# Patient Record
(demographics unavailable — no encounter records)

---

## 2025-03-13 NOTE — ASSESSMENT
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 Most recent labs with SCr ~ 1.2; GFR 61 ml/min UA  previously anita CASTILLO unable to calculate most recent UACR 818 mg/g- will repeat Renal US- b/l renal cysts CKD likely in setting of obesity, HTN Maintain on ARB LDL above goal for CKD; weight loss encouraged Continue Statin  HTN Bp stable Continue amlodipine, Olmesartan, HCTZ  Vitamin D insuff Vitamin D levels 21 Start vitamin D supplements  Chronic thrombocytopenia- mild  monitor for now  DM- HbA1c 6.7 continue MFN, ARB will consider SGLT2 inh for albuminuria  RTC in 6 months.

## 2025-03-13 NOTE — PHYSICAL EXAM
[General Appearance - Alert] : alert [General Appearance - In No Acute Distress] : in no acute distress [Sclera] : the sclera and conjunctiva were normal [Outer Ear] : the ears and nose were normal in appearance [Neck Appearance] : the appearance of the neck was normal [Neck Cervical Mass (___cm)] : no neck mass was observed [Jugular Venous Distention Increased] : there was no jugular-venous distention [Thyroid Diffuse Enlargement] : the thyroid was not enlarged [Thyroid Nodule] : there were no palpable thyroid nodules [Auscultation Breath Sounds / Voice Sounds] : lungs were clear to auscultation bilaterally [Heart Rate And Rhythm] : heart rate was normal and rhythm regular [Edema] : there was no peripheral edema [Bowel Sounds] : normal bowel sounds [Abdomen Soft] : soft [Abdomen Tenderness] : non-tender [Abdomen Mass (___ Cm)] : no abdominal mass palpated [FreeTextEntry1] : cva tenderness [Abnormal Walk] : normal gait [Motor Tone] : muscle strength and tone were normal [Skin Color & Pigmentation] : normal skin color and pigmentation [Skin Turgor] : normal skin turgor [] : no rash [No Focal Deficits] : no focal deficits [Oriented To Time, Place, And Person] : oriented to person, place, and time [Impaired Insight] : insight and judgment were intact [Affect] : the affect was normal

## 2025-06-10 NOTE — PROCEDURE
[de-identified] : I injected the patient's left shoulder today with cortisone.  I discussed at length with the patient the planned steroid and lidocaine injection. The risks, benefits, convalescence and alternatives were reviewed. The possible side effects discussed included but were not limited to: pain, swelling, heat, bleeding, and redness. Symptoms are generally mild but if they are extensive then contact the office. Giving pain relievers by mouth such as NSAIDs or Tylenol can generally treat the reactions to steroid and lidocaine. Rare cases of infection have been noted. Rash, hives and itching may occur post injection. If you have muscle pain or cramps, flushing and or swelling of the face, rapid heart beat, nausea, dizziness, fever, chills, headache, difficulty breathing, swelling in the arms or legs, or have a prickly feeling of your skin, contact a health care provider immediately. Following this discussion, the shoulder was prepped with Chlorhexidine and Alcohol and under sterile conditions the 80 mg Depo-Medrol and 4 cc Lidocaine injection was performed with a 22 gauge needle through a posterolateral injection site. The needle was introduced into the subacromial space and the medication was injected. Upon withdrawal of the needle the site was cleaned with alcohol and a band aid was applied. The patient tolerated the injection well and there were no adverse effects. Post injection instructions included no strenuous activity for 24 hours, cryotherapy and if there are any adverse effects to contact the office.

## 2025-06-10 NOTE — HISTORY OF PRESENT ILLNESS
[de-identified] : 6/10/2025: John hickey a pleasant 69-year-old right-hand-dominant male presents the office today for evaluation of bilateral shoulder pain which is worse on his left side.  The patient states that he has had pain on and off in both shoulders for the past several years which is gotten progressively worse in recent months.  He denies any history of trauma.  The pain is activity related and worse with overhead reaching.  Also wakes him up from sleep at night, particularly on his left side.  He has not had any formal treatment before.  Denies any fevers, chills, sweats, or pain elsewhere.  Of note, the patient is a borderline diabetic.  He is also on Xarelto since 1996 for history of DVT/PE also with a history of IVC filter placed several years ago.

## 2025-06-10 NOTE — DISCUSSION/SUMMARY
[de-identified] : Assessment: 69-year-old male with bilateral shoulder pain, left worse than right, secondary to rotator cuff tendinopathy  Plan: I had a long discussion with the patient today regarding the nature of their diagnosis and treatment plan. We discussed the risks and benefits of no treatment as well as nonoperative and operative treatments.  I reviewed the patient's x-rays today with him in the office which are negative for any acute pathology.  On examination he has reasonably good range of motion and strength on both sides with positive impingement signs.  At this time I recommend conservative treatment of the patient's condition with modalities including rest, ice, heat, anti-inflammatory medications, activity modifications, and home stretching and strengthening exercises.  The patient cannot take oral NSAIDs because of a history of DVT/PE and is currently on Xarelto.  He may take over-the-counter Tylenol as an alternative as needed.  A referral for physical therapy was provided to begin working on exercises to help improve their strength and function.  Additionally, cortisone injection was administered to the patient's left shoulder subacromial space in the office.  The patient tolerated this well and there were no adverse effects.  Since patient is diabetic will not inject the right shoulder today.  Should he wish to consider this injection he will return to the office in 2 weeks.  Otherwise he will follow-up in 8 weeks repeat clinical assessment.  If symptoms persist in the future may consider advanced imaging.  The patient verbalizes their understanding and agrees with the plan.  All questions were answered to their satisfaction.   I, Dr. Garsia, personally performed the evaluation and management (E/M) services for this new patient.  That E/M includes conducting the clinically appropriate initial history &/or exam, assessing all conditions, and establishing the plan of care.  Today, my CADEN, was here to observe my evaluation and management service for this patient & follow plan of care established by me going forward.

## 2025-06-10 NOTE — PHYSICAL EXAM
[de-identified] : General: Awake, alert, no acute distress, Patient was cooperative and appropriate during the examination.  Walks without an antalgic gait.   Bilateral Shoulder Exam: Physical exam of the shoulders demonstrates normal skin without signs of skin changes or abnormalities. No erythema, warmth, or joint effusion appreciated.  Sensation intact light touch C5-T1 bilaterally Palpable radial pulses Radial/ulnar/median/axillary/musculocutaneous/AIN/PIN nerves grossly intact  Range of motion: Forward Flexion: 175 bilaterally Abduction: 90 bilaterally External Rotation: 45 bilaterally Internal Rotation: T12 bilaterally  Palpation: Not tender to palpation over the glenohumeral joints Mildly to moderately tender palpation over the rotator cuff insertion on the greater tuberosities Not tender to palpation over the AC joints Not tender to palpation of the biceps tendon/bicipital grooves  Strength testing: Supraspinatus: 4+/5 on the left, 5/5 on the right Infraspinatus: 5/5 bilaterally Subscapularis: 5/5 bilaterally  Special test: Empty can test positive bilaterally [place] impingement test positive bilaterally, worse on the left Speeds test negative bilaterally [place]'s test negative bilaterally Lift-off test negative bilaterally Bell-press test negative bilaterally Cross-arm adduction test negative bilaterally [de-identified] : X-rays including 4 views of the bilateral shoulders were obtained in the office on 6/10/2025 and reviewed with the patient.  There is no acute fracture or dislocation.  Mild degenerative changes are noted about the bilateral glenohumeral joints.

## 2025-07-01 NOTE — PHYSICAL EXAM
[de-identified] : General: Awake, alert, no acute distress, Patient was cooperative and appropriate during the examination.  Walks without an antalgic gait.   Bilateral Shoulder Exam: Physical exam of the shoulders demonstrates normal skin without signs of skin changes or abnormalities. No erythema, warmth, or joint effusion appreciated.  Sensation intact light touch C5-T1 bilaterally Palpable radial pulses Radial/ulnar/median/axillary/musculocutaneous/AIN/PIN nerves grossly intact  Range of motion: Forward Flexion: 175 bilaterally Abduction: 90 bilaterally External Rotation: 45 bilaterally Internal Rotation: T12 bilaterally  Palpation: Not tender to palpation over the glenohumeral joints Mildly to moderately tender palpation over the rotator cuff insertion on the greater tuberosities Not tender to palpation over the AC joints Not tender to palpation of the biceps tendon/bicipital grooves  Strength testing: Supraspinatus: 5-/5 on the left, 5/5 on the right Infraspinatus: 5/5 bilaterally Subscapularis: 5/5 bilaterally  Special test: Empty can test positive bilaterally [place] impingement test positive bilaterally, worse on the left Speeds test negative bilaterally [place]'s test negative bilaterally Lift-off test negative bilaterally Bell-press test negative bilaterally Cross-arm adduction test negative bilaterally [de-identified] : X-rays including 4 views of the bilateral shoulders were obtained in the office on 6/10/2025 and reviewed with the patient.  There is no acute fracture or dislocation.  Mild degenerative changes are noted about the bilateral glenohumeral joints.

## 2025-07-01 NOTE — DISCUSSION/SUMMARY
[de-identified] : Assessment: 69-year-old male with bilateral shoulder pain, left worse than right, secondary to rotator cuff tendinopathy  Plan: I had a long discussion with the patient today regarding the nature of their diagnosis and treatment plan. We discussed the risks and benefits of no treatment as well as nonoperative and operative treatments.  I reviewed the patient's x-rays today with him in the office which are negative for any acute pathology.  On examination he has reasonably good range of motion and strength on both sides with positive impingement signs.  At this time I recommend conservative treatment of the patient's condition with modalities including rest, ice, heat, anti-inflammatory medications, activity modifications, and home stretching and strengthening exercises.  The patient cannot take oral NSAIDs because of a history of DVT/PE and is currently on Xarelto.  He may take over-the-counter Tylenol as an alternative as needed.  A referral for physical therapy was provided to begin working on exercises to help improve their strength and function.  Additionally, cortisone injection was administered to the patient's right shoulder subacromial space in the office.  The patient tolerated this well and there were no adverse effects.  He will closely monitor his blood sugars over the next week or so given he is a diabetic.  He will follow-up in 8 weeks repeat clinical assessment.  If symptoms persist in the future may consider advanced imaging.  The patient verbalizes their understanding and agrees with the plan.  All questions were answered to their satisfaction.   I, Dr. Garsia, personally performed the evaluation and management (E/M) services for this established patient who presents today with (a) new problem(s)/exacerbation of (an) existing condition(s).  That E/M includes conducting the clinically appropriate interval history &/or exam, assessing all new/exacerbated conditions, and establishing a new plan of care.  Today, my CADEN, was here to observe my evaluation and management service for this new problem/exacerbated condition and follow the plan of care established by me going forward.

## 2025-07-01 NOTE — HISTORY OF PRESENT ILLNESS
[de-identified] : 07/01/2025 : JOHN SERNA  is a 69 year  old male who presents to the office for follow-up evaluation of the bilateral shoulders.  He states the left is doing much better following the injection and he would like to consider an injection into the right today.  He denies any numbness or tingling distally.  He denies any fevers, chills, chest pain or shortness of breath.  6/10/2025: John is a pleasant 69-year-old right-hand-dominant male presents the office today for evaluation of bilateral shoulder pain which is worse on his left side.  The patient states that he has had pain on and off in both shoulders for the past several years which is gotten progressively worse in recent months.  He denies any history of trauma.  The pain is activity related and worse with overhead reaching.  Also wakes him up from sleep at night, particularly on his left side.  He has not had any formal treatment before.  Denies any fevers, chills, sweats, or pain elsewhere.  Of note, the patient is a borderline diabetic.  He is also on Xarelto since 1996 for history of DVT/PE also with a history of IVC filter placed several years ago.

## 2025-07-01 NOTE — PROCEDURE
[de-identified] : I injected the patient's right shoulder today with cortisone.  I discussed at length with the patient the planned steroid and lidocaine injection. The risks, benefits, convalescence and alternatives were reviewed. The possible side effects discussed included but were not limited to: pain, swelling, heat, bleeding, and redness. Symptoms are generally mild but if they are extensive then contact the office. Giving pain relievers by mouth such as NSAIDs or Tylenol can generally treat the reactions to steroid and lidocaine. Rare cases of infection have been noted. Rash, hives and itching may occur post injection. If you have muscle pain or cramps, flushing and or swelling of the face, rapid heart beat, nausea, dizziness, fever, chills, headache, difficulty breathing, swelling in the arms or legs, or have a prickly feeling of your skin, contact a health care provider immediately. Following this discussion, the shoulder was prepped with Chlorhexidine and Alcohol and under sterile conditions the 80 mg Depo-Medrol and 4 cc Lidocaine injection was performed with a 22 gauge needle through a posterolateral injection site. The needle was introduced into the subacromial space and the medication was injected. Upon withdrawal of the needle the site was cleaned with alcohol and a band aid was applied. The patient tolerated the injection well and there were no adverse effects. Post injection instructions included no strenuous activity for 24 hours, cryotherapy and if there are any adverse effects to contact the office.